# Patient Record
Sex: FEMALE | Race: WHITE | NOT HISPANIC OR LATINO | Employment: OTHER | URBAN - METROPOLITAN AREA
[De-identification: names, ages, dates, MRNs, and addresses within clinical notes are randomized per-mention and may not be internally consistent; named-entity substitution may affect disease eponyms.]

---

## 2024-05-02 ENCOUNTER — APPOINTMENT (OUTPATIENT)
Dept: PHYSICAL THERAPY | Facility: CLINIC | Age: 84
End: 2024-05-02
Payer: MEDICARE

## 2024-05-09 ENCOUNTER — EVALUATION (OUTPATIENT)
Dept: PHYSICAL THERAPY | Facility: CLINIC | Age: 84
End: 2024-05-09
Payer: MEDICARE

## 2024-05-09 DIAGNOSIS — R32 UNSPECIFIED URINARY INCONTINENCE: ICD-10-CM

## 2024-05-09 DIAGNOSIS — N39.46 MIXED STRESS AND URGE URINARY INCONTINENCE: Primary | ICD-10-CM

## 2024-05-09 PROCEDURE — 97110 THERAPEUTIC EXERCISES: CPT | Mod: GP | Performed by: PHYSICAL THERAPIST

## 2024-05-09 PROCEDURE — 97162 PT EVAL MOD COMPLEX 30 MIN: CPT | Mod: GP | Performed by: PHYSICAL THERAPIST

## 2024-05-09 NOTE — PROGRESS NOTES
Physical Therapy Evaluation and Treatment      Patient Name: Yoko Cheung  MRN: [id number]  Today's Date: 5/9/2024  Time Calculation  Start Time: 1105  Stop Time: 1205  Time Calculation (min): 60 min      PT Evaluation Time Entry  PT Evaluation (Moderate) Time Entry: 30  PT Therapeutic Procedures Time Entry  Manual Therapy Time Entry: 3  Therapeutic Exercise Time Entry: 25                   INSURANCE:  Visit number: 1/6  MEDICARE    ASSESSMENT:  PT Assessment Results: decreased knowledge of HEP, activity limitations, ADLs/IADLs/self care skills, flexibility, motor function/control/tone, pain, participation restrictions, range of motion/joint mobility, strength, posture, transfers, coordination, balance, fall risk, gait/locomotion.  Rehab Prognosis: Good  Evaluation/Treatment Tolerance: Patient tolerated treatment well    Yoko Cheung is a 83 y.o. female presenting to the clinic with urinary stress incontinence and some urinary urgency at night. Pt demonstrates decreased ROM and strength of the pelvis/B hips and abdominals causing pain and dysfunction with cough/sneezing, lifting/carrying, toileting, bending over, laying on R side, twisting. Pt was given and reviewed HEP including stretching and strengthening. Pt will benefit from skilled PT in order to increase ROM and strength of the pelvis/B hips and abdominals so that the pt can perform ADLs without pain and return to PLOF.     PLAN:  Treatments/Interventions: traction, gait training, dry needling, edema control, education/instruction, home program, self care/home management, manual therapy, neuromuscular re-education, therapeutic activities, therapeutic exercises, modalities, therapeutic elastic taping.   PT Plan: Skilled PT  PT Frequency: 1 time per week  Duration: 6 visits  Onset Date: 05/09/23  Certification Period Start Date: 05/09/24  Certification Period End Date: 08/07/24  Rehab Potential: Good  Plan of Care Agreement: Patient    Goals -    STG -  After about 6 weeks:  Pt will report less than a 3/10 pain at the worst.  Pt will be able to manage changes in intra-abdominal pressure with appropriate pelvic floor and TA muscle activation.  Pt will be able to coordinate pelvic floor with thoracic diaphragm during functional activities that cause symptoms.  Pt will increase by 1 MMT grade for B hips.  Pt will decrease urinary leakage episodes to 0-1 times per week.    LTG - after about 12 weeks:  Pt will report less than a 1/10 pain at the worst.  Pt will have at least 4+/5 to 5/5 strength for B hips.   Pt will have AROM to WFL for the lumbar spine.   Pt will decrease urinary leakage episodes to 0-1 times per month.  Pt will report a significant improvement with Female NIH-CPSI score by 5 points (MDC).  Pt will be independent with progressed HEP.    CURRENT PROBLEM:   1. Mixed stress and urge urinary incontinence        2. Unspecified urinary incontinence  Referral to Physical Therapy    Follow Up In Physical Therapy          Subjective      PELVIC HISTORY:  History of Current Episode/Chief Complaint -  Pt states that she has had incontinence for a long time, but it got worse this past year. Pt states that she thinks she had therapy for it in the past, but she did not keep up with it.    Pt also reports that she was in the hospital last Thursday and Friday due to chest/upper back pain and was generally not feeling well. Pt states that they did a CT scan/EKG/Nuclear stress test. Pt reports that they think it is esophogeal most likely, and was told to take Maalox when it happens again. Pt is scheduled for an colonoscopy and endoscopy on 5/31/24.    Date Onset - 5/9/23    Mechanism of Injury -    Insidious Onset    Pelvic Pain -   Pain when emptying bladder: No  Pain with wiping or tight clothing: No  Pain with intercourse: n/a    Pain Location: Low back, R hip  Pain Today: 0/10  Pain Worst: 3/10   Pain Type: Intermittent, Achy/Dull, Stiffness/Tightness  Pain  Exacerbating Factors: bending over, laying on R side, twisting.  Pain Relieving Factors: Rest    Difficulty Sleeping: sometimes, laying on R side  Night Sweats, Loss of Appetite, Unexpected Weight-loss: No    Pelvic Exercise -   Do you do Kegels? No   Current exercise regime: No    Bladder Hx -   Excessive Urinary Urgency: no  Water Consumption a day: 40 oz  Daytime Voiding Frequency: 3 times  Nighttime Voiding Frequency: 3 times  Unintentional urine loss frequency: 4 times a week about  Leakage occurs with: cough/sneezing, lifting/carrying, seeing the toilet at night, sometimes running water, post void dribbling.   Leakage amount: small  Difficulty initiating flow of urine: No  Slow/weak urine stream: No  Do you push to urinate: No  Able to completely empty bladder: yes    Bowel Hx -   Excessive Bowel Urgency: yes  BM Frequency: almost every day  Frequent Diarrhea: sometimes, better when not taking her metformin  Frequent constipation/straining/incomplete emptying: Yes  Supplements: Sometimes takes metamucil  Unintentional stool loss: not in several months  FI occurs with what activity: farting  Consistency of stool when FI occurs: Brown Shredded paper consistency     Home Living -    Stairs into Home: 2 steps with railing  Stairs in Home: 15 steps with railing  Pt lives with her .    Patient Stated Goals - reduce leaking    PRECAUTIONS -    Hx: DM, OA, Hypothyrioid, Breast CA, Proplapse, 3 vaginal deliveries, partial hysterectomy     Prior Level of Function -    I with ADLs/IADLs     Objective     Hip AROM (degrees) - WFL grossly except reduced IR/ext    Hip MMT (/5) -  R hip Flexion: 4+   R hip ER: 3+   R hip IR: 4-   R hip Abduction: 4   L hip Flexion: 4+    L hip ER: 4-   L hip IR: 4-   L hip Abduction: 4     Lumbar AROM - [by Percent]   Lumbar Flexion: 80  Lumbar Extension: 50%  R Lumbar Side Bendin%  L Lumbar Side Bendin%    Posture -   Rounded Shoulders  Forward Head  Increased  Thoracic Kyphosis   Lower Crossed Syndrome  Increased Lumbar Lordosis/APT    Functional Assessment/Special Tests -  Trendelenburg positive bilateral  Breathing: good excursion  JENNI positive bilateral (R>L)  Ab positive bilateral  SLR negative bilateral    Flexibility -   Decreased Tissue Length of: Iliopsoas, Adductors, HS (R>L)    Palpation - Consent to External Palpation Verbally Given  Diastasis: 1 finger  Pelvic Alignment: L longer malleolus    Outcome Measures -   Other Measures  Other Outcome Measures: Female NIH-CPSI: 16 (Pain 3, Urinary 5, QOL 8)     Treatment -   Evaluation -   Moderate (48575)  Therapeutic Exercise (29501) -     Modified Ab Stretch: 30 sec ea  Supine Piriformis Stretch: 30 sec ea  Bridge on Heels: x10  Clamshell: x10 ea    OP Patient Education -   Access Code: [email or web address]  URL: [email or web address]  Date: 05/09/2024  Prepared by: Kinza Page    Exercises  - Modified Ab Stretch  - 1-2 x daily - 3 sets - 30 seconds hold  - Supine Piriformis Stretch with Foot on Ground  - 1-2 x daily - 3 sets - 30 sec hold  - Bridge on Heels  - 1-2 x daily - 2-3 sets - 10 reps - 2 sec hold  - Clamshell  - 1-2 x daily - 2-3 sets - 10 reps

## 2024-05-16 ENCOUNTER — TREATMENT (OUTPATIENT)
Dept: PHYSICAL THERAPY | Facility: CLINIC | Age: 84
End: 2024-05-16
Payer: MEDICARE

## 2024-05-16 DIAGNOSIS — N39.46 MIXED STRESS AND URGE URINARY INCONTINENCE: Primary | ICD-10-CM

## 2024-05-16 PROCEDURE — 97110 THERAPEUTIC EXERCISES: CPT | Mod: GP | Performed by: PHYSICAL THERAPIST

## 2024-05-16 PROCEDURE — 97140 MANUAL THERAPY 1/> REGIONS: CPT | Mod: GP | Performed by: PHYSICAL THERAPIST

## 2024-05-16 NOTE — PROGRESS NOTES
Physical Therapy Treatment    Patient Name: Yoko Cheung  MRN: [id number]  Today's Date: 5/16/2024  Time Calculation  Start Time: 1053  Stop Time: 1155  Time Calculation (min): 62 min       PT Therapeutic Procedures Time Entry  Manual Therapy Time Entry: 23  Therapeutic Exercise Time Entry: 32                   INSURANCE:  Visit number: 2/6  MEDICARE    CURRENT PROBLEM:  1. Mixed stress and urge urinary incontinence          SUBJECTIVE:   General -   Pt is doing home exercises.  Difficult to find time to do the exercises. Had appointments every day this week.    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    OBJECTIVE:     Internal Palpation Exam - Consent to Pelvic Floor Internal Exam Verbally Given, Performed using universal precautions/gloves    Pelvic Floor External Visual Observation -   Contraction with Anal Calmar: reduced  Bearing Down with distension around anus: reduced  Cough with Contraction: Present    External Palpation -   Perineal Body: no TTP  Superficial Transverse Perineal Muscles: (R) no TTP; (L) no TTP    Visual Observation -   Good Tissue Color: Yes  Acosta of Clitoris has good movement: Yes  Alignment of Perineal Body in relation to ischial tuberosities: inferior, small perenial body    Internal Palpation -   Ischiocavernosus: (R) mild TTP; (L) mild TTP  Bulbocavernosus: (R) mild TTP; (L) moderate TTP  Periurethrals: (R) mild TTP; (L) no TTP  Levator Ani: (R) no TTP; (L) no TTP  Obturator Internus: (R) moderate TTP; (L) no TTP    Pelvic Floor Strength - P / E / R // F   Power MVC: 2/5  Endurance MVC (up to 10 sec): 2 sec  Repetitions of Endurance MVC with 4 second break: 1   Fast Twitch (up to 10 reps): 2    Treatment -   Therapeutic Exercise (75997) -  3-way bridges: x15 ea  Clamshells: 2x10 ea B  Reverse Clamshells: 2x10 ea B  TA isometric push into table: 5 sec x15  Butterfly stretch:  30 sec   Piriformis stretch: 30 sec ea B  Modified Happy Baby: 1 min  Manual Therapy (09618) -    Internal Assessment    OP Patient Education -     Access Code: [email or web address]  URL: [email or web address]  Date: 05/16/2024  Prepared by: Kinza Page  Added Exercises:  - Sidelying Reverse Clamshell  - 1-2 x daily - 2-3 sets - 10 reps  - Supine Transversus Abdominis Bracing - Hands on Ground  - 1-2 x daily - 2 sets - 10 reps - 5 sec hold  - Supine Butterfly Groin Stretch  - 1-2 x daily - 3 sets - 30 sec hold  - Supine Piriformis Stretch with Foot on Ground  - 1-2 x daily - 3 sets - 30 sec hold  - Seated Happy Baby With Trunk Flexion For Pelvic Relaxation  - 1-2 x daily - 1-2 min hold    ASSESSMENT:     Internal assessment performed with gentle TPR for reduced pain and spasm. Pt was given an updated HEP with progressed bridges and more stretching exercises. Plan to introduce biofeedback next visit. Pt tolerated session well and is progressing towards functional needs. Continue to progress pt within tolerance and POC.    PLAN:    Continue to progress pt within tolerance. Plan to introduce biofeedback next visit.

## 2024-05-23 ENCOUNTER — APPOINTMENT (OUTPATIENT)
Dept: PHYSICAL THERAPY | Facility: CLINIC | Age: 84
End: 2024-05-23
Payer: MEDICARE

## 2024-05-30 ENCOUNTER — APPOINTMENT (OUTPATIENT)
Dept: PHYSICAL THERAPY | Facility: CLINIC | Age: 84
End: 2024-05-30
Payer: MEDICARE

## 2024-09-05 ENCOUNTER — EVALUATION (OUTPATIENT)
Dept: PHYSICAL THERAPY | Facility: CLINIC | Age: 84
End: 2024-09-05
Payer: MEDICARE

## 2024-09-05 DIAGNOSIS — R42 DIZZINESS: Primary | Chronic | ICD-10-CM

## 2024-09-05 DIAGNOSIS — R32 UNSPECIFIED URINARY INCONTINENCE: ICD-10-CM

## 2024-09-05 PROCEDURE — 97110 THERAPEUTIC EXERCISES: CPT | Mod: GP | Performed by: PHYSICAL THERAPIST

## 2024-09-05 PROCEDURE — 97161 PT EVAL LOW COMPLEX 20 MIN: CPT | Mod: GP | Performed by: PHYSICAL THERAPIST
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
## 2024-09-09 ENCOUNTER — TREATMENT (OUTPATIENT)
Dept: PHYSICAL THERAPY | Facility: CLINIC | Age: 84
End: 2024-09-09
Payer: MEDICARE

## 2024-09-09 DIAGNOSIS — R42 DIZZINESS: Primary | ICD-10-CM

## 2024-09-09 PROCEDURE — 97110 THERAPEUTIC EXERCISES: CPT | Mod: GP | Performed by: PHYSICAL THERAPIST

## 2024-09-09 PROCEDURE — 97140 MANUAL THERAPY 1/> REGIONS: CPT | Mod: GP | Performed by: PHYSICAL THERAPIST

## 2024-09-11 ASSESSMENT — PATIENT HEALTH QUESTIONNAIRE - PHQ9
2. FEELING DOWN, DEPRESSED OR HOPELESS: NOT AT ALL
SUM OF ALL RESPONSES TO PHQ9 QUESTIONS 1 AND 2: 0
1. LITTLE INTEREST OR PLEASURE IN DOING THINGS: NOT AT ALL

## 2024-09-13 NOTE — PROGRESS NOTES
"Physical Therapy Treatment    Patient Name: Yoko Cheung  MRN: [id number]  Today's Date: 9/9/24   Time in: 1315  Time out: 1345                      Current Problem  1. Dizziness            Insurance  Visit number: 2/4  MEDICARE A&B, AARP SUPP. MED NEC NO AUTH, PER RTE 5/1/24    Precautions         Subjective : pt reports no dizziness just neck stiffness and cracking      Pain   1-2/10 neck stiffness R>L side      Objective   AROM c-spine:   Flex: nil  Retract: mod  Retract/ext: mod  L rotation: min-mod  R rotation: min- mod    Thoracic AROM:   Ext: mod-major    Treatments:  There Ex: 15 mins  Repeated retractions: 3x10  T-spine ext over chair: 2x10  Scap squeeze: 5\" x20    Manual: 15 mins  STM and TPR to bilateral: upper trap, levator, scalenes        Assessment:     Pt reports minimal pain only tightness and cracking in neck, nil difficulty with VOR so ceased that exercise this date. Performed there ex as above, tolerates well but remains tight into CT junction extension.     Plan:     Will continue to focus on improving AROM of CT area to reduce spasming and cracking in neck    Goals:     "

## 2024-09-18 ENCOUNTER — APPOINTMENT (OUTPATIENT)
Dept: PHYSICAL THERAPY | Facility: CLINIC | Age: 84
End: 2024-09-18
Payer: MEDICARE

## 2024-09-25 ENCOUNTER — APPOINTMENT (OUTPATIENT)
Dept: PHYSICAL THERAPY | Facility: CLINIC | Age: 84
End: 2024-09-25
Payer: MEDICARE

## 2024-10-02 ENCOUNTER — TREATMENT (OUTPATIENT)
Dept: PHYSICAL THERAPY | Facility: CLINIC | Age: 84
End: 2024-10-02
Payer: MEDICARE

## 2024-10-02 DIAGNOSIS — R42 DIZZINESS: Primary | ICD-10-CM

## 2024-10-02 DIAGNOSIS — R32 UNSPECIFIED URINARY INCONTINENCE: ICD-10-CM

## 2024-10-02 PROCEDURE — 97110 THERAPEUTIC EXERCISES: CPT | Mod: GP | Performed by: PHYSICAL THERAPIST

## 2024-10-02 PROCEDURE — 97140 MANUAL THERAPY 1/> REGIONS: CPT | Mod: GP | Performed by: PHYSICAL THERAPIST

## 2024-10-08 ENCOUNTER — TREATMENT (OUTPATIENT)
Dept: PHYSICAL THERAPY | Facility: CLINIC | Age: 84
End: 2024-10-08
Payer: MEDICARE

## 2024-10-08 DIAGNOSIS — R42 DIZZINESS: Primary | Chronic | ICD-10-CM

## 2024-10-08 DIAGNOSIS — R32 UNSPECIFIED URINARY INCONTINENCE: ICD-10-CM

## 2024-10-08 PROCEDURE — 97110 THERAPEUTIC EXERCISES: CPT | Mod: GP | Performed by: PHYSICAL THERAPIST

## 2024-10-09 NOTE — PROGRESS NOTES
"Physical Therapy Treatment    Patient Name: Yoko Cheung  MRN: [id number]  Today's Date: 10/5/24   Time in: 1315  Time out: 1400  PT Therapeutic Procedures Time Entry  Manual Therapy Time Entry: 30  Therapeutic Exercise Time Entry: 15                   Current Problem  1. Dizziness        2. Unspecified urinary incontinence  Follow Up In Physical Therapy          Insurance  Visit number: 3/4  MEDICARE A&B, AARP SUPP. MED NEC NO AUTH, PER RTE 5/1/24    Precautions         Subjective : pt reports       Pain   1-2/10 neck stiffness R>L side      Objective   AROM c-spine:   Flex: nil  Retract: mod  Retract/ext: mod  L rotation: min-mod  R rotation: min- mod    Thoracic AROM:   Ext: mod-major    Treatments:  There Ex: 15 mins  Repeated retractions: 3x10  T-spine ext over chair: 2x10  Scap squeeze: 5\" x20    Manual: 30 mins  STM and TPR to bilateral: upper trap, levator, scalenes, subocciput        Assessment:  Pt reports relief from manual this date and improved AROM, will review and distribute HEP next visit and DC. All pt questions answered.     Plan:    Will update HEP and DC next visit.     "

## 2024-10-10 ENCOUNTER — APPOINTMENT (OUTPATIENT)
Dept: PHYSICAL THERAPY | Facility: CLINIC | Age: 84
End: 2024-10-10
Payer: MEDICARE

## 2024-10-14 NOTE — PROGRESS NOTES
Physical Therapy Discahrge    Patient Name: Yoko Cheung  MRN: [id number]  Today's Date: 10/8/2024  Time Calculation  Start Time: 1000  Stop Time: 1030  Time Calculation (min): 30 min     PT Therapeutic Procedures Time Entry  Therapeutic Exercise Time Entry: 30                   Current Problem  1. Dizziness        2. Unspecified urinary incontinence  Follow Up In Physical Therapy          Insurance  Visit number: 4/4  MEDICARE A&B, AARP SUPP. MED NEC NO AUTH, PER RTE 5/1/24    Precautions        Subjective   Pt reports feeling well, mild tightness persists      Pain  0-1/10 pain, mostly tightness      Objective :  AROM c-spine:   Flex: nil  Retract: min/mod  L rotation: min  R rotation: min  Extension: mind/mod    Treatments:  Ther Ex:   Rep retracts 3x10  T-spine ext: 3x10  Scap squeeze 3x10    Manual:   STM to posterior neck/upper traps    Review/update HEP          Goals:    1) pt demo independence w/advanced HEP- MET   2) pt demo AROM of cervical spine min-nil deficits to perform all functional mobility- MET  3) pt demo good posture in sit/stand to reduce pain in cervical spine- MET  4) pt reports central neck pain </=2/10 at all times to perform all functional mobility- MET  5) Increased Kip UE strength 1/2-1 muscle grade to achieve max functional strength to stabilize spine- MET  6) Decrease NDI score to < or equal to 20% for evidence of improved function - MET    Assessment:     Pt reports feeling well, all goals met, updated HEP, all pt questions answered.     Plan:    DC to HEP